# Patient Record
Sex: MALE | Race: WHITE | URBAN - METROPOLITAN AREA
[De-identification: names, ages, dates, MRNs, and addresses within clinical notes are randomized per-mention and may not be internally consistent; named-entity substitution may affect disease eponyms.]

---

## 2019-12-03 ENCOUNTER — OFFICE (OUTPATIENT)
Dept: URBAN - METROPOLITAN AREA CLINIC 78 | Facility: CLINIC | Age: 54
End: 2019-12-03

## 2019-12-03 VITALS
HEART RATE: 86 BPM | HEIGHT: 66 IN | SYSTOLIC BLOOD PRESSURE: 144 MMHG | DIASTOLIC BLOOD PRESSURE: 109 MMHG | TEMPERATURE: 97.5 F | WEIGHT: 214 LBS

## 2019-12-03 DIAGNOSIS — Z86.010 PERSONAL HISTORY OF COLONIC POLYPS: ICD-10-CM

## 2019-12-03 DIAGNOSIS — K62.5 HEMORRHAGE OF ANUS AND RECTUM: ICD-10-CM

## 2019-12-03 PROCEDURE — 99244 OFF/OP CNSLTJ NEW/EST MOD 40: CPT

## 2019-12-03 PROCEDURE — 00031: CPT

## 2019-12-03 NOTE — SERVICEHPINOTES
SAMARA DUDLEY   is a   54   year old male who is being seen in consultation at the request of   TON CARTER   for OV prior to colonoscopy due to blood in stool. His last colonoscopy was 1/19/15 which showed moderately severe diverticulosis in sigmoid colon but otherwise normal, recall 5 years. H/o small tubular adenoma in 2002. BRHe had shoulder surgery in Oct, was taking opioids afterwards and had resultant constipation due to this. Noticed brbpr when wiping at this time. Has since been taking Metamucil which has helped. for a few days had LLQ pain but this subsided with metamucil. BRBMs are now back to normal and regular. Denies further issues with constipation, diarrhea, blood in the stool, melena, abdominal pain, weight loss, fever or chills.  Patient denies family history of colon cancer, polyps or IBD.  No cardiac or pulmonary issues..

## 2020-01-24 ENCOUNTER — OFFICE (OUTPATIENT)
Dept: URBAN - METROPOLITAN AREA CLINIC 32 | Facility: CLINIC | Age: 55
End: 2020-01-24

## 2020-01-24 VITALS
TEMPERATURE: 98.8 F | TEMPERATURE: 97.7 F | RESPIRATION RATE: 14 BRPM | HEART RATE: 85 BPM | RESPIRATION RATE: 27 BRPM | SYSTOLIC BLOOD PRESSURE: 132 MMHG | OXYGEN SATURATION: 100 % | OXYGEN SATURATION: 95 % | RESPIRATION RATE: 31 BRPM | WEIGHT: 212 LBS | DIASTOLIC BLOOD PRESSURE: 97 MMHG | SYSTOLIC BLOOD PRESSURE: 124 MMHG | HEART RATE: 84 BPM | DIASTOLIC BLOOD PRESSURE: 104 MMHG | DIASTOLIC BLOOD PRESSURE: 90 MMHG | RESPIRATION RATE: 15 BRPM | HEART RATE: 96 BPM | SYSTOLIC BLOOD PRESSURE: 135 MMHG | DIASTOLIC BLOOD PRESSURE: 84 MMHG | DIASTOLIC BLOOD PRESSURE: 95 MMHG | OXYGEN SATURATION: 97 % | HEIGHT: 66 IN | HEART RATE: 82 BPM | SYSTOLIC BLOOD PRESSURE: 131 MMHG | RESPIRATION RATE: 20 BRPM | OXYGEN SATURATION: 94 % | SYSTOLIC BLOOD PRESSURE: 138 MMHG | SYSTOLIC BLOOD PRESSURE: 143 MMHG | DIASTOLIC BLOOD PRESSURE: 83 MMHG | OXYGEN SATURATION: 96 % | HEART RATE: 74 BPM | RESPIRATION RATE: 16 BRPM

## 2020-01-24 DIAGNOSIS — D12.0 BENIGN NEOPLASM OF CECUM: ICD-10-CM

## 2020-01-24 DIAGNOSIS — K62.5 HEMORRHAGE OF ANUS AND RECTUM: ICD-10-CM

## 2020-01-24 DIAGNOSIS — D12.2 BENIGN NEOPLASM OF ASCENDING COLON: ICD-10-CM

## 2020-01-24 DIAGNOSIS — Z86.010 PERSONAL HISTORY OF COLONIC POLYPS: ICD-10-CM

## 2020-01-24 PROBLEM — K57.30 DVRTCLOS OF LG INT W/O PERFORATION OR ABSCESS W/O BLEEDING: Status: ACTIVE | Noted: 2020-01-24

## 2020-01-24 LAB
GI LOWER POLYPECTOMY EXCISION - SPECM 1: CLINICAL INFORMATION: (no result)
GI LOWER POLYPECTOMY EXCISION - SPECM 1: CLINICAL OBSERVATIONS: (no result)
GI LOWER POLYPECTOMY EXCISION - SPECM 1: CPT CODES: (no result)
GI LOWER POLYPECTOMY EXCISION - SPECM 1: DIAGNOSIS: (no result)
GI LOWER POLYPECTOMY EXCISION - SPECM 1: GROSS DESCRIPTION: (no result)
GI LOWER POLYPECTOMY EXCISION - SPECM 1: INCOMING ICD CODE(S): (no result)
GI LOWER POLYPECTOMY EXCISION - SPECM 1: MICROSCOPIC DESCRIPTION: (no result)
GI LOWER POLYPECTOMY EXCISION - SPECM 1: PATHOLOGIST: (no result)
GI LOWER POLYPECTOMY EXCISION - SPECM 1: REPORT TITLE: (no result)
GI LOWER POLYPECTOMY EXCISION - SPECM 1: SPECIMEN COMMENT: (no result)
GI LOWER POLYPECTOMY EXCISION - SPECM 1: SPECIMEN SOURCE: (no result)
GI LOWER POLYPECTOMY EXCISION - SPECM 1: SYNOPSIS: (no result)
GI LOWER POLYPECTOMY EXCISION - SPECM 2: CLINICAL INFORMATION: (no result)
GI LOWER POLYPECTOMY EXCISION - SPECM 2: CLINICAL OBSERVATIONS: (no result)
GI LOWER POLYPECTOMY EXCISION - SPECM 2: CPT CODES: (no result)
GI LOWER POLYPECTOMY EXCISION - SPECM 2: DIAGNOSIS: (no result)
GI LOWER POLYPECTOMY EXCISION - SPECM 2: GROSS DESCRIPTION: (no result)
GI LOWER POLYPECTOMY EXCISION - SPECM 2: INCOMING ICD CODE(S): (no result)
GI LOWER POLYPECTOMY EXCISION - SPECM 2: MICROSCOPIC DESCRIPTION: (no result)
GI LOWER POLYPECTOMY EXCISION - SPECM 2: PATHOLOGIST: (no result)
GI LOWER POLYPECTOMY EXCISION - SPECM 2: PDF REPORT: (no result)
GI LOWER POLYPECTOMY EXCISION - SPECM 2: REPORT TITLE: (no result)
GI LOWER POLYPECTOMY EXCISION - SPECM 2: SPECIMEN COMMENT: (no result)
GI LOWER POLYPECTOMY EXCISION - SPECM 2: SPECIMEN SOURCE: (no result)
GI LOWER POLYPECTOMY EXCISION - SPECM 2: SYNOPSIS: (no result)

## 2020-01-24 PROCEDURE — 45380 COLONOSCOPY AND BIOPSY: CPT

## 2023-02-09 ENCOUNTER — OFFICE (OUTPATIENT)
Dept: URBAN - METROPOLITAN AREA CLINIC 34 | Facility: CLINIC | Age: 58
End: 2023-02-09

## 2023-02-09 PROCEDURE — 00031: CPT | Performed by: INTERNAL MEDICINE

## 2023-02-17 ENCOUNTER — AMBULATORY SURGICAL CENTER (OUTPATIENT)
Dept: URBAN - METROPOLITAN AREA CLINIC 30 | Facility: CLINIC | Age: 58
End: 2023-02-17
Payer: COMMERCIAL

## 2023-02-17 VITALS
OXYGEN SATURATION: 95 % | SYSTOLIC BLOOD PRESSURE: 109 MMHG | SYSTOLIC BLOOD PRESSURE: 133 MMHG | SYSTOLIC BLOOD PRESSURE: 141 MMHG | RESPIRATION RATE: 11 BRPM | HEART RATE: 71 BPM | RESPIRATION RATE: 25 BRPM | SYSTOLIC BLOOD PRESSURE: 120 MMHG | HEART RATE: 75 BPM | HEART RATE: 72 BPM | TEMPERATURE: 97.7 F | DIASTOLIC BLOOD PRESSURE: 80 MMHG | RESPIRATION RATE: 23 BRPM | OXYGEN SATURATION: 94 % | RESPIRATION RATE: 16 BRPM | HEART RATE: 57 BPM | SYSTOLIC BLOOD PRESSURE: 142 MMHG | OXYGEN SATURATION: 92 % | HEIGHT: 66 IN | WEIGHT: 197 LBS | HEART RATE: 65 BPM | RESPIRATION RATE: 14 BRPM | OXYGEN SATURATION: 97 % | DIASTOLIC BLOOD PRESSURE: 94 MMHG | DIASTOLIC BLOOD PRESSURE: 83 MMHG | RESPIRATION RATE: 31 BRPM | TEMPERATURE: 97.8 F | HEART RATE: 66 BPM | DIASTOLIC BLOOD PRESSURE: 66 MMHG | DIASTOLIC BLOOD PRESSURE: 93 MMHG | OXYGEN SATURATION: 100 % | RESPIRATION RATE: 26 BRPM

## 2023-02-17 DIAGNOSIS — Z86.010 PERSONAL HISTORY OF COLONIC POLYPS: ICD-10-CM

## 2023-02-17 DIAGNOSIS — K63.5 POLYP OF COLON: ICD-10-CM

## 2023-02-17 DIAGNOSIS — K57.30 DIVERTICULOSIS OF LARGE INTESTINE WITHOUT PERFORATION OR ABS: ICD-10-CM

## 2023-02-17 PROBLEM — D12.3 BENIGN NEOPLASM OF TRANSVERSE COLON: Status: ACTIVE | Noted: 2023-02-17

## 2023-02-17 PROCEDURE — 45380 COLONOSCOPY AND BIOPSY: CPT | Performed by: INTERNAL MEDICINE
